# Patient Record
Sex: MALE | Race: OTHER | NOT HISPANIC OR LATINO | URBAN - METROPOLITAN AREA
[De-identification: names, ages, dates, MRNs, and addresses within clinical notes are randomized per-mention and may not be internally consistent; named-entity substitution may affect disease eponyms.]

---

## 2017-03-17 PROBLEM — Z00.00 ENCOUNTER FOR PREVENTIVE HEALTH EXAMINATION: Status: ACTIVE | Noted: 2017-03-17

## 2017-03-19 ENCOUNTER — OUTPATIENT (OUTPATIENT)
Dept: OUTPATIENT SERVICES | Facility: HOSPITAL | Age: 47
LOS: 1 days | End: 2017-03-19
Payer: COMMERCIAL

## 2017-03-19 ENCOUNTER — APPOINTMENT (OUTPATIENT)
Dept: CT IMAGING | Facility: IMAGING CENTER | Age: 47
End: 2017-03-19

## 2017-03-19 DIAGNOSIS — Z00.8 ENCOUNTER FOR OTHER GENERAL EXAMINATION: ICD-10-CM

## 2017-03-19 PROCEDURE — 74170 CT ABD WO CNTRST FLWD CNTRST: CPT

## 2017-03-19 PROCEDURE — 82565 ASSAY OF CREATININE: CPT

## 2018-10-16 ENCOUNTER — EMERGENCY (EMERGENCY)
Facility: HOSPITAL | Age: 48
LOS: 1 days | Discharge: HOME | End: 2018-10-16
Attending: EMERGENCY MEDICINE | Admitting: EMERGENCY MEDICINE
Payer: MEDICAID

## 2018-10-16 VITALS
SYSTOLIC BLOOD PRESSURE: 145 MMHG | TEMPERATURE: 98 F | OXYGEN SATURATION: 100 % | HEART RATE: 69 BPM | RESPIRATION RATE: 15 BRPM | DIASTOLIC BLOOD PRESSURE: 89 MMHG

## 2018-10-16 PROCEDURE — 73090 X-RAY EXAM OF FOREARM: CPT | Mod: 26,RT

## 2018-10-16 PROCEDURE — 99283 EMERGENCY DEPT VISIT LOW MDM: CPT

## 2018-10-16 NOTE — ED PROVIDER NOTE - PHYSICAL EXAMINATION
RUE - (+) hematoma over proximal volar aspect of forearm, small defect noted over tricep brachii tendon, 2+ radial pulse, NVI, brisk cap refill;

## 2018-10-16 NOTE — ED PROVIDER NOTE - CARE PLAN
Principal Discharge DX:	Hematoma  Assessment and plan of treatment:	PLEASE TAKE IBUPROFEN 600MG EVERY 8HRS AS NEEDED FOR THE PAIN, APPLY ICE TO THE AREA FOIR THE NEXT 48HRS, LATER YOU MAY SWITCH TO WARM COMPRESSES. FOLLOW UP WITH ORTHOPEDIST WITHIN 7 DAYS. RETURN TO ER FOR WORSENING SYMPTOMS. Principal Discharge DX:	Bruise  Assessment and plan of treatment:	PLEASE TAKE IBUPROFEN 600MG EVERY 8HRS AS NEEDED FOR THE PAIN, APPLY ICE TO THE AREA FOIR THE NEXT 48HRS, LATER YOU MAY SWITCH TO WARM COMPRESSES. FOLLOW UP WITH ORTHOPEDIST WITHIN 7 DAYS. RETURN TO ER FOR WORSENING SYMPTOMS.

## 2018-10-16 NOTE — ED PROVIDER NOTE - PLAN OF CARE
PLEASE TAKE IBUPROFEN 600MG EVERY 8HRS AS NEEDED FOR THE PAIN, APPLY ICE TO THE AREA FOIR THE NEXT 48HRS, LATER YOU MAY SWITCH TO WARM COMPRESSES. FOLLOW UP WITH ORTHOPEDIST WITHIN 7 DAYS. RETURN TO ER FOR WORSENING SYMPTOMS.

## 2018-10-16 NOTE — ED PROVIDER NOTE - ATTENDING CONTRIBUTION TO CARE
I performed a face-to-face evaluation of the patient and performed a history and physical examination. I agree with the history and physical examination.    48 M, yesterday a box fell and hit him in R arm (distal upper arm, prox forearm). Swelling and bruise R prox forearm. NVI. Arm and hand not cold. Full, strong biceps function. EDBUS no hematoma. Plan: xray, pain meds.

## 2018-10-16 NOTE — ED ADULT TRIAGE NOTE - CHIEF COMPLAINT QUOTE
Pt c/o R arm injury yesterday.  States he was trying to catch a falling box full of watches and it slid down his arm.  Pt with large hematoma to R FA, swelling noted.  Pt with +ROM, +radial pulse.  Pt states slight relief motrin.  Denies any PMHx.

## 2018-10-16 NOTE — ED PROVIDER NOTE - OBJECTIVE STATEMENT
49 y/o M pt with no sig PMHx, BIB sister, arrives to the ED c/o RUE pain/bruising s/p having a box of watches fall on his RUE while at work yesterday. Pt notes that he has work tomorrow. Motrin for relief. Initial cold compresses for minimal relief. Right hand dominant. Denies numbness/tingling or any other complaints. No daily meds. NKDA. Social hx:+, social EtOH use. 49 y/o M pt with no sig PMHx, BIB sister, arrives to the ED c/o RUE pain/bruising s/p having a box of watches fall on his RUE while at work yesterday.  Motrin for relief. Initial cold compresses for minimal relief. Right hand dominant. Denies numbness/tingling or any other complaints. No daily meds. NKDA. Social hx:+, social EtOH use. Has FROM of the affected extremity, not on blood thinners.

## 2022-09-18 NOTE — ED PROVIDER NOTE - ATTESTATION, MLM
room air I have reviewed and confirmed nurses' notes for patient's medications, allergies, medical history, and surgical history.